# Patient Record
(demographics unavailable — no encounter records)

---

## 2024-10-31 NOTE — PHYSICAL EXAM
[Normal Breath Sounds] : Normal breath sounds [Normal Rate and Rhythm] : normal rate and rhythm [2+] : left 2+ [No Rash or Lesion] : No rash or lesion [Alert] : alert [Calm] : calm [JVD] : no jugular venous distention  [Ankle Swelling (On Exam)] : not present [Varicose Veins Of Lower Extremities] : not present [] : not present [Abdomen Tenderness] : ~T ~M No abdominal tenderness [de-identified] : Appears well [de-identified] : Healed tracheostomy scar [FreeTextEntry1] : Excellent thrill in fistula [de-identified] : Intact

## 2024-10-31 NOTE — ASSESSMENT
[Arterial/Venous Disease] : arterial/venous disease [Medication Management] : medication management [FreeTextEntry1] : 58-year-old male with history of aortic dissection status post ax bifemoral bypass.  In the office today, patient underwent duplex which demonstrates a patent bypass with no evidence of stenosis.  Continue labetalol for management of hypertension. Follow-up 1 year for bypass surveillance.  Patient with ESRD currently on hemodialysis via left radiocephalic AV fistula.  Duplex demonstrates well-functioning AV fistula with minimal outflow stenosis.  Patient to follow-up in 4 to 5 months for fistula duplex and SMA stent.

## 2024-10-31 NOTE — HISTORY OF PRESENT ILLNESS
[FreeTextEntry1] : 58-year-old gentleman with a complicated medical history of aortic dissection s/p SMA stent and ax-bifemoral bypass due to occlusion and ESRD currently on hemodialysis via left upper extremity AV fistula who presents the office today for routine surveillance.  Patient denies any present issues with dialysis.  Denies rest pain or claudication symptoms.  Denies tissue loss.  Denies nausea or vomiting.  Denies postprandial pain.

## 2025-03-04 NOTE — ASSESSMENT
[Arterial/Venous Disease] : arterial/venous disease [Medication Management] : medication management [FreeTextEntry1] : 59-year-old male with history of aortic dissection status post ax bifemoral bypass.  Continue labetalol for management of hypertension. Follow-up scheduled in November for bypass surveillance.  Patient with ESRD currently on hemodialysis via left radiocephalic AV fistula.  Duplex demonstrates a well-functioning AV fistula with high-grade outflow stenosis.  Given the patient is without any present issues, we will continue to monitor and hold off on any interventions at this time.  Patient to follow-up in 4 months for fistula duplex.  Patient to return to office for duplex of SMA stent. In the interim, patient to continue aspirin.

## 2025-03-04 NOTE — PHYSICAL EXAM
[Normal] : normal rate, regular rhythm, normal S1/S2, no murmur [Thrill] : thrill [Hand well perfused] : hand well perfused [Warm Extremities] : warm extremities [Pulsatile Thrill] : no pulsatile thrill [Aneurysm] : no aneurysm [Bleeding] : no bleeding [Ulcer] : no ulcer [de-identified] : Intact

## 2025-03-04 NOTE — HISTORY OF PRESENT ILLNESS
[] : left radiocephalic fistula [FreeTextEntry1] : 59-year-old gentleman with a complicated medical history of aortic dissection s/p SMA stent and ax-bifemoral bypass due to occlusion and ESRD currently on hemodialysis via left upper extremity AV fistula who presents the office today for routine surveillance.  Patient denies any present issues with dialysis. [place] and is to resume hemodialysis on Thursday via [place] emergency [facility].  Denies rest pain or claudication symptoms.  Denies tissue loss.  Denies nausea or vomiting.  Denies postprandial pain.